# Patient Record
Sex: FEMALE | Race: ASIAN | Employment: FULL TIME | URBAN - METROPOLITAN AREA
[De-identification: names, ages, dates, MRNs, and addresses within clinical notes are randomized per-mention and may not be internally consistent; named-entity substitution may affect disease eponyms.]

---

## 2024-07-31 ENCOUNTER — HOSPITAL ENCOUNTER (EMERGENCY)
Facility: HOSPITAL | Age: 34
Discharge: HOME | End: 2024-07-31
Attending: EMERGENCY MEDICINE
Payer: MEDICAID

## 2024-07-31 ENCOUNTER — APPOINTMENT (OUTPATIENT)
Dept: CT IMAGING | Facility: HOSPITAL | Age: 34
End: 2024-07-31
Attending: EMERGENCY MEDICINE
Payer: MEDICAID

## 2024-07-31 ENCOUNTER — APPOINTMENT (OUTPATIENT)
Dept: GENERAL RADIOLOGY | Facility: HOSPITAL | Age: 34
End: 2024-07-31
Payer: MEDICAID

## 2024-07-31 VITALS
DIASTOLIC BLOOD PRESSURE: 77 MMHG | TEMPERATURE: 98 F | SYSTOLIC BLOOD PRESSURE: 108 MMHG | RESPIRATION RATE: 17 BRPM | OXYGEN SATURATION: 99 % | WEIGHT: 125 LBS | HEART RATE: 77 BPM

## 2024-07-31 DIAGNOSIS — M54.9 BACK PAIN WITHOUT RADIATION: Primary | ICD-10-CM

## 2024-07-31 DIAGNOSIS — N89.8 VAGINAL IRRITATION: ICD-10-CM

## 2024-07-31 DIAGNOSIS — N30.00 ACUTE CYSTITIS WITHOUT HEMATURIA: ICD-10-CM

## 2024-07-31 LAB
ALBUMIN SERPL-MCNC: 4.9 G/DL (ref 3.2–4.8)
ALBUMIN/GLOB SERPL: 1.3 {RATIO} (ref 1–2)
ALP LIVER SERPL-CCNC: 56 U/L
ALT SERPL-CCNC: 24 U/L
ANION GAP SERPL CALC-SCNC: 5 MMOL/L (ref 0–18)
AST SERPL-CCNC: 36 U/L (ref ?–34)
B-HCG UR QL: NEGATIVE
BASOPHILS # BLD AUTO: 0.02 X10(3) UL (ref 0–0.2)
BASOPHILS NFR BLD AUTO: 0.2 %
BILIRUB SERPL-MCNC: 0.6 MG/DL (ref 0.3–1.2)
BILIRUB UR QL STRIP.AUTO: NEGATIVE
BUN BLD-MCNC: 6 MG/DL (ref 9–23)
CALCIUM BLD-MCNC: 9.6 MG/DL (ref 8.7–10.4)
CHLORIDE SERPL-SCNC: 104 MMOL/L (ref 98–112)
CO2 SERPL-SCNC: 28 MMOL/L (ref 21–32)
COLOR UR AUTO: YELLOW
CREAT BLD-MCNC: 0.58 MG/DL
EGFRCR SERPLBLD CKD-EPI 2021: 121 ML/MIN/1.73M2 (ref 60–?)
EOSINOPHIL # BLD AUTO: 0.05 X10(3) UL (ref 0–0.7)
EOSINOPHIL NFR BLD AUTO: 0.5 %
ERYTHROCYTE [DISTWIDTH] IN BLOOD BY AUTOMATED COUNT: 11.6 %
GLOBULIN PLAS-MCNC: 3.7 G/DL (ref 2–3.5)
GLUCOSE BLD-MCNC: 86 MG/DL (ref 70–99)
GLUCOSE UR STRIP.AUTO-MCNC: NORMAL MG/DL
HCT VFR BLD AUTO: 43.6 %
HGB BLD-MCNC: 14.2 G/DL
IMM GRANULOCYTES # BLD AUTO: 0.02 X10(3) UL (ref 0–1)
IMM GRANULOCYTES NFR BLD: 0.2 %
LEUKOCYTE ESTERASE UR QL STRIP.AUTO: 250
LYMPHOCYTES # BLD AUTO: 2.72 X10(3) UL (ref 1–4)
LYMPHOCYTES NFR BLD AUTO: 26.3 %
MCH RBC QN AUTO: 27.4 PG (ref 26–34)
MCHC RBC AUTO-ENTMCNC: 32.6 G/DL (ref 31–37)
MCV RBC AUTO: 84.2 FL
MONOCYTES # BLD AUTO: 0.48 X10(3) UL (ref 0.1–1)
MONOCYTES NFR BLD AUTO: 4.6 %
NEUTROPHILS # BLD AUTO: 7.05 X10 (3) UL (ref 1.5–7.7)
NEUTROPHILS # BLD AUTO: 7.05 X10(3) UL (ref 1.5–7.7)
NEUTROPHILS NFR BLD AUTO: 68.2 %
NITRITE UR QL STRIP.AUTO: NEGATIVE
OSMOLALITY SERPL CALC.SUM OF ELEC: 281 MOSM/KG (ref 275–295)
PH UR STRIP.AUTO: 6 [PH] (ref 5–8)
PLATELET # BLD AUTO: 309 10(3)UL (ref 150–450)
PLATELETS.RETICULATED NFR BLD AUTO: 3.4 % (ref 0–7)
POTASSIUM SERPL-SCNC: 4.6 MMOL/L (ref 3.5–5.1)
PROT SERPL-MCNC: 8.6 G/DL (ref 5.7–8.2)
PROT UR STRIP.AUTO-MCNC: 50 MG/DL
RBC # BLD AUTO: 5.18 X10(6)UL
SODIUM SERPL-SCNC: 137 MMOL/L (ref 136–145)
SP GR UR STRIP.AUTO: 1.01 (ref 1–1.03)
UROBILINOGEN UR STRIP.AUTO-MCNC: NORMAL MG/DL
WBC # BLD AUTO: 10.3 X10(3) UL (ref 4–11)

## 2024-07-31 PROCEDURE — 87077 CULTURE AEROBIC IDENTIFY: CPT | Performed by: EMERGENCY MEDICINE

## 2024-07-31 PROCEDURE — 99284 EMERGENCY DEPT VISIT MOD MDM: CPT

## 2024-07-31 PROCEDURE — 74176 CT ABD & PELVIS W/O CONTRAST: CPT | Performed by: EMERGENCY MEDICINE

## 2024-07-31 PROCEDURE — 85025 COMPLETE CBC W/AUTO DIFF WBC: CPT | Performed by: EMERGENCY MEDICINE

## 2024-07-31 PROCEDURE — 87086 URINE CULTURE/COLONY COUNT: CPT | Performed by: EMERGENCY MEDICINE

## 2024-07-31 PROCEDURE — 81025 URINE PREGNANCY TEST: CPT

## 2024-07-31 PROCEDURE — 80053 COMPREHEN METABOLIC PANEL: CPT | Performed by: EMERGENCY MEDICINE

## 2024-07-31 PROCEDURE — 96374 THER/PROPH/DIAG INJ IV PUSH: CPT

## 2024-07-31 PROCEDURE — 81001 URINALYSIS AUTO W/SCOPE: CPT | Performed by: EMERGENCY MEDICINE

## 2024-07-31 PROCEDURE — 99285 EMERGENCY DEPT VISIT HI MDM: CPT

## 2024-07-31 PROCEDURE — 96375 TX/PRO/DX INJ NEW DRUG ADDON: CPT

## 2024-07-31 PROCEDURE — 72110 X-RAY EXAM L-2 SPINE 4/>VWS: CPT

## 2024-07-31 RX ORDER — CHOLECALCIFEROL (VITAMIN D3) 25 MCG
1 TABLET,CHEWABLE ORAL DAILY
COMMUNITY

## 2024-07-31 RX ORDER — HYDROMORPHONE HYDROCHLORIDE 1 MG/ML
0.5 INJECTION, SOLUTION INTRAMUSCULAR; INTRAVENOUS; SUBCUTANEOUS ONCE
Status: COMPLETED | OUTPATIENT
Start: 2024-07-31 | End: 2024-07-31

## 2024-07-31 RX ORDER — KETOROLAC TROMETHAMINE 15 MG/ML
15 INJECTION, SOLUTION INTRAMUSCULAR; INTRAVENOUS ONCE
Status: COMPLETED | OUTPATIENT
Start: 2024-07-31 | End: 2024-07-31

## 2024-07-31 RX ORDER — CEFADROXIL 500 MG/1
500 CAPSULE ORAL 2 TIMES DAILY
Qty: 20 CAPSULE | Refills: 0 | Status: SHIPPED | OUTPATIENT
Start: 2024-07-31 | End: 2024-08-10

## 2024-07-31 RX ORDER — CYCLOBENZAPRINE HCL 10 MG
10 TABLET ORAL 3 TIMES DAILY PRN
Qty: 20 TABLET | Refills: 0 | Status: SHIPPED | OUTPATIENT
Start: 2024-07-31 | End: 2024-08-07

## 2024-07-31 RX ORDER — ONDANSETRON 2 MG/ML
4 INJECTION INTRAMUSCULAR; INTRAVENOUS ONCE
Status: COMPLETED | OUTPATIENT
Start: 2024-07-31 | End: 2024-07-31

## 2024-07-31 NOTE — ED INITIAL ASSESSMENT (HPI)
Patient endorses right sided lower back \" Pain is 7/10 pain does not radiate. Denies any injuries or falls. No thinners.

## 2024-07-31 NOTE — ED PROVIDER NOTES
Patient Seen in: [facility] Emergency Department      History     Chief Complaint   Patient presents with    Back Pain     Stated Complaint: Back pain for one year - arrives by EMS - declined pain meds    Subjective:   HPI    34-year-old female complaining of right flank pain.  The patient states this started about 5 AM the day it has been fairly continuous does not seem to worsen with movement or change of position.  She has slight nausea but no vomiting she does feel some pain in the right lower quadrant and some urinary urgency but no hematuria.  She denies any history of kidney stones no fever or chills.  Initially patient described pain as starting at 5 AM today but further history the patient's had intermittent pain over the past year but today it worsened in the right flank later she described the pain is in the left flank as well.  It worsens with movement.  She also described a slight vaginal discharge and irritation.  The patient somewhat language barrier used  service patient declined a pelvic exam or further investigation of this and was given a gynecological referral regarding her vaginal irritation.  Objective:   History reviewed. No pertinent past medical history.           History reviewed. No pertinent surgical history.             Social History     Socioeconomic History    Marital status:    Tobacco Use    Smoking status: Never    Smokeless tobacco: Never   Substance and Sexual Activity    Alcohol use: Never    Drug use: Never     Social Determinants of Health     Financial Resource Strain: Not at Risk (7/26/2024)    Received from [facility]    Financial Resource Strain     Financial Resource Strain: 1   Food Insecurity: No Food Insecurity (3/17/2024)    Received from [facility]    Food Insecurity     Currently or in the past 3 months, have you worried your food would run out before you had money to buy more?: No     In the past 12 months, have you run out of  food or been unable to get more?: No   Transportation Needs: No Transportation Needs (3/17/2024)    Received from [facility]    Transportation Needs     Medical Transportation Needs?: No   Physical Activity: Not on File (10/7/2022)    Received from [facility]    Physical Activity     Physical Activity: 0   Stress: Not on File (10/7/2022)    Received from [facility]    Stress     Stress: 0   Social Connections: Not on File (10/7/2022)    Received from [facility]    Social Connections     Social Connections and Isolation: 0   Housing Stability: Not at Risk (2024)    Received from [facility]    Housing Stability     Housin              Review of Systems    Positive for stated Chief Complaint: Back Pain    Other systems are as noted in HPI.  Constitutional and vital signs reviewed.      All other systems reviewed and negative except as noted above.    Physical Exam     ED Triage Vitals   BP 24 1334 108/77   Pulse 24 1334 90   Resp 24 1334 18   Temp 24 1334 97.8 °F (36.6 °C)   Temp src 24 1334 Temporal   SpO2 24 1500 99 %   O2 Device 24 1334 None (Room air)       Current Vitals:   No data recorded          Physical Exam    Patient is alert and orient x 3 appears somewhat uncomfortable HEENT exam within normal limits neck there is no lymphadenopathy or JVD lungs are clear cardiovascular exam shows regular rate and rhythm without murmurs abdomen is soft there is some mild suprapubic tenderness mild right flank tenderness no masses guarding or rebound.    ED Course     Labs Reviewed   COMP METABOLIC PANEL (14) - Abnormal; Notable for the following components:       Result Value    BUN 6 (*)     AST 36 (*)     Total Protein 8.6 (*)     Albumin 4.9 (*)     Globulin  3.7 (*)     All other components within normal limits   URINALYSIS WITH CULTURE REFLEX - Abnormal; Notable for the following components:    Clarity Urine Turbid (*)     Ketones Urine Trace (*)     Blood Urine 1+  (*)     Protein Urine 50 (*)     Leukocyte Esterase Urine 250 (*)     WBC Urine 11-20 (*)     RBC Urine 3-5 (*)     Bacteria Urine 1+ (*)     Squamous Epi. Cells Few (*)     All other components within normal limits   URINE CULTURE, ROUTINE - Abnormal; Notable for the following components:    Urine Culture   (*)     Value: >100,000 CFU/ML Streptococcus agalactiae (Group B beta strep)    All other components within normal limits   POCT PREGNANCY URINE - Normal   CBC WITH DIFFERENTIAL WITH PLATELET    Narrative:     The following orders were created for panel order CBC With Differential With Platelet.  Procedure                               Abnormality         Status                     ---------                               -----------         ------                     CBC W/ DIFFERENTIAL[[id number]]                              Final result                 Please view results for these tests on the individual orders.   SCAN SLIDE   CBC W/ DIFFERENTIAL             CT ABDOMEN+PELVIS KIDNEYSTONE 2D RNDR(NO IV,NO ORAL)(CPT=74176)    Result Date: 7/31/2024  CONCLUSION:   1. No calculi in the kidneys.  No hydronephrosis.  2. No evidence of an acute inflammatory process.     LOCATION:  [place] [id number]   Dictated by (CST): Varun Rosario MD on 7/31/2024 at 4:54 PM     Finalized by (CST): Varun Rosario MD on 7/31/2024 at 4:56 PM       XR LUMBAR SPINE (MIN 4 VIEWS) (CPT=72110)    Result Date: 7/31/2024  PROCEDURE:  XR LUMBAR SPINE (MIN 4 VIEWS) (CPT=72110)  TECHNIQUE:  AP, lateral, oblique, and coned down L5-S1 views were obtained.  COMPARISON:  None.  INDICATIONS:  Back pain for one year - arrives by EMS - declined pain meds  PATIENT STATED HISTORY: (As transcribed by Technologist)  Patient offered no additional history at this time.   FINDINGS: Alignment is normal. Vertebral body heights are maintained throughout the lumbar spine. Disc spaces are maintained throughout the lumbar spine. No significant bony abnormalities.  IMPRESSION: Unremarkable radiographs of the lumbar spine   LOCATION:  [id number]   Dictated by (CST): Varun Rosario MD on 7/31/2024 at 1:59 PM     Finalized by (CST): Varun Rosario MD on 7/31/2024 at 2:00 PM      Images independent reviewed there is no hydronephrosis.         MDM      Initial differential diagnosis considered but not limited to includes urinary tract infection pyelonephritis kidney stone muscular back pain.      Patient has some mild suggestion of urinary tract infection there is no hydronephrosis no kidney stone.  She was given antibiotics advised to follow-up with gynecology regarding her vaginal irritation that she did not want further treatment for.  Advised return if worse                                   Medical Decision Making      Disposition and Plan     Clinical Impression:  1. Back pain without radiation    2. Vaginal irritation    3. Acute cystitis without hematuria         Disposition:  Discharge  7/31/2024  5:46 pm    Follow-up:  Ирина Moore MD  [address] [facility] [address]  [facility] [address]  [phone]    Follow up in 1 week(s)      [address]  [facility] [address]  [facility] [id number] [phone]  Call in 1 week  choose option 2 for neurosurgery or pain follow up          Medications Prescribed:  Discharge Medication List as of 7/31/2024  5:55 PM        START taking these medications    Details   cyclobenzaprine 10 MG Oral Tab Take 1 tablet (10 mg total) by mouth 3 (three) times daily as needed for Muscle spasms., Normal, Disp-20 tablet, R-0      cefadroxil 500 MG Oral Cap Take 1 capsule (500 mg total) by mouth 2 (two) times daily for 10 days., Normal, Disp-20 capsule, R-0

## 2024-07-31 NOTE — DISCHARGE INSTRUCTIONS
Tylenol or Advil for milder pain take Flexeril follow-up your doctor next 2 days return any problems.    Follow-up with spine center regarding your back pain.  Follow-up with gynecology regarding vaginal irritation or discharge.

## 2024-08-02 NOTE — PROGRESS NOTES
ED Culture Callback Results Review    Pharmacist reviewed culture results from ED visit .    Final urine culture positive for GBS. Patient was prescribed Cefadroxil (Duricef) on discharge. Current therapy is appropriate based on reported susceptibilities. No further intervention required at this time.    Zachary Mulligan, PharmD  Emergency Medicine Pharmacist Specialist  08/02/24; 11:18 AM

## 2025-01-07 ENCOUNTER — TELEPHONE (OUTPATIENT)
Dept: UROLOGY | Facility: CLINIC | Age: 35
End: 2025-01-07

## 2025-01-15 ENCOUNTER — OFFICE VISIT (OUTPATIENT)
Dept: UROLOGY | Facility: CLINIC | Age: 35
End: 2025-01-15
Attending: OBSTETRICS & GYNECOLOGY
Payer: MEDICAID

## 2025-01-15 VITALS — WEIGHT: 126 LBS | SYSTOLIC BLOOD PRESSURE: 106 MMHG | TEMPERATURE: 98 F | DIASTOLIC BLOOD PRESSURE: 68 MMHG

## 2025-01-15 DIAGNOSIS — N94.10 DYSPAREUNIA IN FEMALE: ICD-10-CM

## 2025-01-15 DIAGNOSIS — R35.0 URINARY FREQUENCY: ICD-10-CM

## 2025-01-15 DIAGNOSIS — R35.1 NOCTURIA: ICD-10-CM

## 2025-01-15 DIAGNOSIS — R10.2 PELVIC PAIN: Primary | ICD-10-CM

## 2025-01-15 DIAGNOSIS — M62.89 PELVIC FLOOR TENSION: ICD-10-CM

## 2025-01-15 LAB
BLOOD URINE: NEGATIVE
CONTROL RUN WITHIN 24 HOURS?: YES
LEUKOCYTE ESTERASE URINE: NEGATIVE
NITRITE URINE: NEGATIVE

## 2025-01-15 PROCEDURE — 87086 URINE CULTURE/COLONY COUNT: CPT | Performed by: OBSTETRICS & GYNECOLOGY

## 2025-01-15 PROCEDURE — 81002 URINALYSIS NONAUTO W/O SCOPE: CPT | Performed by: OBSTETRICS & GYNECOLOGY

## 2025-01-15 PROCEDURE — 99202 OFFICE O/P NEW SF 15 MIN: CPT

## 2025-01-15 NOTE — PROGRESS NOTES
ID: Milton Moser  : 1990  Date: 1/15/2025     Referred by Urgent care.     Chief Complaint   Patient presents with    Prolapse     Referred by Dr Michael Cedeno, Urgent Care       HPI:  34 year old Kiswahili speaking female, G3, Vaginal deliveries x2, who presents for evaluation of vaginal prolapse. She feels  \"something coming down\" and low pelvic discomfort. Symptoms present since last delivery on 3/2024, apparently uncomplicated.   Follows up at the [facility]  Last visit in  per NP. No mention about any of these symptoms.  Not currently breastfeeding.  She reports normal periods.   She is voiding every 1-2 hours during the day and x 3 at night.   Also reports VESNA and UUI  Does not feel she is emptying well  No history of UTIs. No recent urines on file.  Bowels are regular. Reports \"passing of gas\" per vagina at times.   Reports pain with intercourse.   Using condoms for contraception. Would like to get DepoProvera. Not done with childbearing.   Healthy otherwise.     Urogynecology Summary:  Urogynecology Summary  Prolapse: Yes  VESNA: Yes (cough and sneeze)  Urge Incontinence: Yes  Nocturia Frequency: 3  Frequency: 1 - 2 hours  Incomplete emptying: Yes  Constipation: No  Wears pad day?: 0  Wears Pad Night?: 0  Activities are limited by UI/POP?: No  Currently Sexually Active: Yes  Avoids sexual activity due to: Pain          HISTORY:  No past medical history on file.   No past surgical history on file.   No family history on file.   Social History     Socioeconomic History    Marital status:    Tobacco Use    Smoking status: Never    Smokeless tobacco: Never   Substance and Sexual Activity    Alcohol use: Never    Drug use: Never     Social Drivers of Health     Financial Resource Strain: Not at Risk (2024)    Received from [facility]    Financial Resource Strain     Financial Resource Strain: 1   Food Insecurity: No Food Insecurity (3/17/2024)    Received from [facility]    Food  Insecurity     Currently or in the past 3 months, have you worried your food would run out before you had money to buy more?: No     In the past 12 months, have you run out of food or been unable to get more?: No   Transportation Needs: No Transportation Needs (3/17/2024)    Received from [facility]    Transportation Needs     Currently or in the past 3 months, has lack of transportation kept you from medical appointments, getting food or medicine, or providing care to a family member?: Unrecognized value     Medical Transportation Needs?: No   Physical Activity: Not on File (10/7/2022)    Received from [facility]    Physical Activity     Physical Activity: 0   Stress: Not on File (10/7/2022)    Received from [facility]    Stress     Stress: 0   Social Connections: Not on File (2024)    Received from [facility]    Social Connections     Connectedness: 0   Housing Stability: Not at Risk (2024)    Received from [facility]    Housing Stability     Housin        Allergies:  Allergies[1]    Medications:  Medications Prior to Visit[2]    Review of Systems:    A comprehensive 12 point review of systems was completed.  Pertinent positives noted in the the HPI.  Denies CP  Denies SOB    Vitals:  /68   Temp 98 °F (36.7 °C)   Wt 126 lb (57.2 kg)        GENERAL EXAM:  GENERAL:  Alert and oriented. Well-nourished, normally developed.  Thought and emotional status are appropriate, speech is understandable.  No acute distress.   HEAD: Normocephalic and atraumatic with normal hair distribution  LUNGS:  Normal respiratory effort.    ABDOMEN: Non tender to palpation, tone normal without rigidity or guarding, no masses present, no evidence of hernia.   EXTREMITIES:  Without edema, varicosities or lesions.   SKIN:  Warm and dry, with good color and turgor. No lesions.    PELVIC EXAM:  External Genitalia: Normal appearance for age. No atrophy, no lesions  Urethra: no atrophy, non tender  Bladder:no fullness, no  tender  Vagina: no atrophy, no lesions, + diffuse vaginal tenderness, no vaginal defects or fistula noted.   Cervix: no bleeding, no lesions, non tender  Uterus: soft, mobile, non tender   Adnexa:no masses, non tender  Perineum: non tender  Anus: no hemorrhoids  Rectum: deferred.    PELVIS FLOOR NEUROMUSCULAR FUNCTION:  Strength:  1, Increased tone, and Tender  Perineal Sensation:  Normal      PELVIC SUPPORT:  [place]:  1  Ant:  1  Post:  1  CST:  negative  UVJ: + hypermobile    The patient voided 300 ml in the privacy of the bathroom. She was catheterized for PVR of 10 ml.  Urine dip negative. Specimen sent for C&S.       Impression/Plan:    ICD-10-CM    1. Pelvic pain  R10.2       2. Urinary frequency  R35.0 POCT urinalysis dipstick[01559]     Urine Culture, Routine      3. Nocturia  R35.1       4. Dyspareunia in female  N94.10 PHYSICAL THERAPY - [facility]      5. Pelvic floor tension  M62.89 PHYSICAL THERAPY - [facility]          Discussion Items:   Behavioral and pharmacologic treatments for OAB  Pelvic muscle rehabilitation including pelvic floor PT  Discussed dietary and behavioral modification, discussed pharmacologic and nonpharmacologic mgmt options for urinary symptoms. Discussed dietary & weight management with potential improvements in symptoms with weight loss.    Diagnostic Items:  Urine C&S    Medications Discussed:  None    Treatment Plan, Non-surgical:   Pelvic floor PT. Referral provided.     Treatment Plan, Surgical:   None    Pt verbalizes understanding of all above discussed information. Follow up in 4 months or sooner prn. OK to see YUE Montero interpretor utilized during visit.     Margaret Latham MD, FACOG, FACS  Female Pelvic Medicine and  Reconstructive Surgery (Urogynecology)           [1] No Known Allergies  [2]   Outpatient Medications Prior to Visit   Medication Sig Dispense Refill    prenatal vitamin with DHA 27-0.8-228 MG Oral Cap Take 1 capsule by mouth daily. (Patient  not taking: Reported on 1/15/2025)      Magnesium Cl-Calcium Carbonate 71.5-119 MG Oral Tab EC Take 2 tablets by mouth daily. (Patient not taking: Reported on 1/15/2025)       No facility-administered medications prior to visit.

## 2025-05-19 ENCOUNTER — OFFICE VISIT (OUTPATIENT)
Dept: UROLOGY | Facility: CLINIC | Age: 35
End: 2025-05-19
Attending: OBSTETRICS & GYNECOLOGY
Payer: MEDICAID

## 2025-05-19 VITALS — RESPIRATION RATE: 16 BRPM | TEMPERATURE: 98 F

## 2025-05-19 DIAGNOSIS — M62.89 PELVIC FLOOR TENSION: ICD-10-CM

## 2025-05-19 DIAGNOSIS — R35.0 URINARY FREQUENCY: Primary | ICD-10-CM

## 2025-05-19 DIAGNOSIS — N39.41 URGE URINARY INCONTINENCE: ICD-10-CM

## 2025-05-19 DIAGNOSIS — N39.3 FEMALE STRESS INCONTINENCE: ICD-10-CM

## 2025-05-19 DIAGNOSIS — N81.84 PELVIC MUSCLE WASTING: ICD-10-CM

## 2025-05-19 PROCEDURE — 99212 OFFICE O/P EST SF 10 MIN: CPT

## 2025-05-19 NOTE — PROGRESS NOTES
Patient presents to follow up PF symptoms    Did not complete PFPT since last visit, still interested    Reports both UUI and VESNA  Biggest bother is urinary frequency  Denies bulge sx    Bowels reg  Denies UTI s/sx    Urogynecology Summary:  Urogynecology Summary  Prolapse: No  VESNA: Yes  Urge Incontinence: No  Nocturia Frequency: 2  Frequency: 1 - 2 hours  Incomplete emptying: No  Constipation: No  Wears pad day?: 0  Wears Pad Night?: 0  Activities are limited by UI/POP?: No  Currently Sexually Active: Yes    Vitals:  Temp 97.8 °F (36.6 °C)   Resp 16     GENERAL EXAM:  GENERAL: alert & oriented, NAD  HEENT: NC/AT, sclera without injection  SKIN: no rashes  LUNGS:  without increased respiratory effort  EXT: no edema    PELVIC EXAM:   Deferred     Impression/Plan:    ICD-10-CM    1. Urinary frequency  R35.0 PHYSICAL THERAPY - [facility]      2. Urge urinary incontinence  N39.41 PHYSICAL THERAPY - [facility]      3. Female stress incontinence  N39.3 PHYSICAL THERAPY - [facility]      4. Pelvic floor tension  M62.89 PHYSICAL THERAPY - [facility]      5. Pelvic muscle wasting  N81.84 PHYSICAL THERAPY - External Location          Discussion Items:   Discussed dietary and behavioral modifications for mgmt of urinary symptoms.    Discussed management options for VESNA including expectant management, pelvic floor PT, mentioned surgery (MUS).      Treatment Plan:  Start PFPT  Bowel regimen  Bladder diet/drill  Pelvic floor exercises  Call with s/sx of UTI    All questions answered  She understands and agrees to plan    Return in about 4 months (around 9/19/2025) for PFPT f/u.    Erin Dillon PA-C    Note to patient: The 21st Century Cures Act makes medical notes like these available to patients in the interest of transparency.  However, be advised this is a medical document.  It is intended as peer to peer communication.  It is written in medical language and may contain abbreviations or verbiage  that are unfamiliar.  It may appear blunt or direct.  Medical documents are intended to carry relevant information, facts as evident, and the clinical opinion of the practitioner.